# Patient Record
Sex: FEMALE | Race: WHITE
[De-identification: names, ages, dates, MRNs, and addresses within clinical notes are randomized per-mention and may not be internally consistent; named-entity substitution may affect disease eponyms.]

---

## 2020-01-30 ENCOUNTER — HOSPITAL ENCOUNTER (OUTPATIENT)
Dept: HOSPITAL 92 - SCSMRI | Age: 20
Discharge: HOME | End: 2020-01-30
Attending: ORTHOPAEDIC SURGERY
Payer: COMMERCIAL

## 2020-01-30 DIAGNOSIS — S93.401A: Primary | ICD-10-CM

## 2020-01-30 NOTE — MRI
MRI OF RIGHT ANKLE PERFORMED WITHOUT CONTRAST ENHANCEMENT:

1/30/20

 

HISTORY: 

Patient has history of an older medial malleolar fracture and has been having persistent pain. 

 

COMPARISON: 

Ankle film of 10/30/18.

 

The Achilles tendon is normal in appearance. 

 

The anterior extensor tendon group is normal. 

 

The peroneus longus and brevis tendons are intact. Posterior tibialis, flexor digitorum longus and fl
exor hallucis longus tendons are also normal in appearance. 

 

The sinus tarsi region is normal. Plantar fascia is not thickened. 

 

Lisfranc ligament is intact. Spring ligament complex appears unremarkable.

 

No osteochondral lesion of the talar dome. There are some mild edema changes along the talus near the
 attachment of the deep fibers of the deltoid. The syndesmotic ligaments appear intact. Anterior talo
fibular ligament is somewhat indistinct but appears intact. Posterior talofibular ligament and calcan
eofibular ligaments are intact. 

 

IMPRESSION: 

Some minimal edema changes along the medial edge of the talus at the level of the attachment of the d
eep fibers of the deltoid. Otherwise essentially unremarkable exam. There are perhaps some minimal ed
ema changes associated with the posterior talofibular ligament but fibers are intact. 

 

POS: [facility]